# Patient Record
Sex: FEMALE | Race: WHITE | URBAN - METROPOLITAN AREA
[De-identification: names, ages, dates, MRNs, and addresses within clinical notes are randomized per-mention and may not be internally consistent; named-entity substitution may affect disease eponyms.]

---

## 2022-03-29 ENCOUNTER — OFFICE VISIT (OUTPATIENT)
Dept: URBAN - METROPOLITAN AREA CLINIC 54 | Facility: CLINIC | Age: 22
End: 2022-03-29
Payer: OTHER GOVERNMENT

## 2022-03-29 ENCOUNTER — WEB ENCOUNTER (OUTPATIENT)
Dept: URBAN - METROPOLITAN AREA CLINIC 54 | Facility: CLINIC | Age: 22
End: 2022-03-29

## 2022-03-29 DIAGNOSIS — K50.013 CROHN'S DISEASE OF SMALL INTESTINE WITH FISTULA: ICD-10-CM

## 2022-03-29 PROCEDURE — 99204 OFFICE O/P NEW MOD 45 MIN: CPT | Performed by: STUDENT IN AN ORGANIZED HEALTH CARE EDUCATION/TRAINING PROGRAM

## 2022-03-29 RX ORDER — ADALIMUMAB 40MG/0.8ML
0.8 ML KIT SUBCUTANEOUS EVERY 2 WEEKS
Qty: 6 | Refills: 1 | OUTPATIENT
Start: 2022-03-29 | End: 2022-09-25

## 2022-03-29 NOTE — EXAM-PHYSICAL EXAM
General: Well appearing. No acute distress. HEENT: Anicteric sclerae Cardiovascular: Normal heart rate Respiratory: Breathing comfortably without conversational dyspnea Abdomen:  Hypogastric vertical surgical scar, non-distended, soft, non-tender Rectal: Deferred Skin: without visible rashes on examined areas. Musculoskeletal: ambulated without difficulty. Can stand from sitting position without assistance. Neuro: No focal deficits. Alert and oriented. Psych: Appropriate mood and affect.

## 2022-03-29 NOTE — HPI-TODAY'S VISIT:
Ms. Tam is a 21-year-old woman who presents today for Crohn's disease. 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   Patient states that she was diagnosed with Crohn's at age 15 (2016).  She had abdominal pain, vomiting and bloody stools and subsequent had colonoscopy and EGD.  Colonoscopy significant for ileal disease.  Medical history Apriso X 8 months Remicade x 3-4 months. Stopped due to side effects (Hair loss, depression, anxiety) Methotrexate x 1.5 years.  She self-discontinued due to side effects ( Hair loss, depression, anxiety) in 2018. 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 She subsequently established care with Dr. Erika Mondragon, a gastroenterologist in [place] who started her on Humira in January 2022. She now takes Humira 40 mg q 2 weeks.  She recently took Ibuprofen and is a current smoker (vape)  Kenji Goel Index Questionnaire:  General well being: Very well  Abdominal pain:  None  Number of liquid stool for the previous day: None.  Abdominal Mass: none Arthalgia: No Uveitis: No EN: No Apthous Ulcers: No Pyoderma Gangrenosum: No Anal Fissures: No New Fistula: No Abscess: No

## 2022-04-07 ENCOUNTER — TELEPHONE ENCOUNTER (OUTPATIENT)
Dept: URBAN - METROPOLITAN AREA CLINIC 54 | Facility: CLINIC | Age: 22
End: 2022-04-07

## 2022-04-07 RX ORDER — ADALIMUMAB 40MG/0.8ML
0.8 ML KIT SUBCUTANEOUS EVERY 2 WEEKS
Qty: 6 | Refills: 1 | Status: ACTIVE | COMMUNITY
Start: 2022-03-29 | End: 2022-09-25

## 2022-04-07 RX ORDER — ADALIMUMAB 40MG/0.8ML
AS DIRECTED KIT SUBCUTANEOUS
Refills: 1 | OUTPATIENT
Start: 2022-04-11

## 2022-04-10 LAB
A/G RATIO: 1.7
ADALIMUMAB DRUG LEVEL: 3
ALBUMIN: 4.7
ALKALINE PHOSPHATASE: 98
ALT (SGPT): 17
ANTI-ADALIMUMAB ANTIBODY: 37
AST (SGOT): 16
BASO (ABSOLUTE): 0.1
BASOS: 1
BILIRUBIN, TOTAL: 0.3
BUN/CREATININE RATIO: 21
BUN: 12
C-REACTIVE PROTEIN, QUANT: <1
CALCIUM: 10.2
CARBON DIOXIDE, TOTAL: 21
CHLORIDE: 103
CREATININE: 0.56
EGFR: 133
EOS (ABSOLUTE): 0.2
EOS: 3
FOLATE (FOLIC ACID), SERUM: 12.1
GLOBULIN, TOTAL: 2.8
GLUCOSE: 76
HEMATOCRIT: 42.6
HEMATOLOGY COMMENTS:: (no result)
HEMOGLOBIN: 13.6
IMMATURE CELLS: (no result)
IMMATURE GRANS (ABS): 0
IMMATURE GRANULOCYTES: 0
LYMPHS (ABSOLUTE): 2.8
LYMPHS: 41
MCH: 29.6
MCHC: 31.9
MCV: 93
MONOCYTES(ABSOLUTE): 0.5
MONOCYTES: 8
NEUTROPHILS (ABSOLUTE): 3.3
NEUTROPHILS: 47
NRBC: (no result)
PLATELETS: 187
POTASSIUM: 3.8
PROTEIN, TOTAL: 7.5
RBC: 4.6
RDW: 12
SODIUM: 138
VITAMIN B12: 514
VITAMIN D, 25-HYDROXY: 12.2
WBC: 6.9

## 2022-04-11 PROBLEM — 56689002: Status: ACTIVE | Noted: 2022-03-29

## 2022-04-24 ENCOUNTER — TELEPHONE ENCOUNTER (OUTPATIENT)
Dept: URBAN - METROPOLITAN AREA CLINIC 54 | Facility: CLINIC | Age: 22
End: 2022-04-24

## 2022-04-24 PROBLEM — 34713006: Status: ACTIVE | Noted: 2022-04-24

## 2022-04-24 RX ORDER — ERGOCALCIFEROL 1.25 MG/1
1 CAPSULE CAPSULE, LIQUID FILLED ORAL WEEKLY
Qty: 8 | Refills: 0 | OUTPATIENT
Start: 2022-04-24 | End: 2022-06-19

## 2022-04-24 RX ORDER — ADALIMUMAB 40MG/0.8ML
0.8 ML KIT SUBCUTANEOUS EVERY 2 WEEKS
Qty: 6 | Refills: 1 | Status: ON HOLD | COMMUNITY
Start: 2022-03-29 | End: 2022-09-25

## 2022-04-24 RX ORDER — ADALIMUMAB 40MG/0.8ML
AS DIRECTED KIT SUBCUTANEOUS
Refills: 1 | Status: ACTIVE | COMMUNITY
Start: 2022-04-11

## 2022-04-24 RX ORDER — ADALIMUMAB 40MG/0.8ML
AS DIRECTED KIT SUBCUTANEOUS
Qty: 6 PRE-FILLED PEN SYRINGE | Refills: 1 | OUTPATIENT

## 2022-04-29 ENCOUNTER — TELEPHONE ENCOUNTER (OUTPATIENT)
Dept: URBAN - METROPOLITAN AREA CLINIC 54 | Facility: CLINIC | Age: 22
End: 2022-04-29

## 2022-05-02 ENCOUNTER — TELEPHONE ENCOUNTER (OUTPATIENT)
Dept: URBAN - METROPOLITAN AREA CLINIC 54 | Facility: CLINIC | Age: 22
End: 2022-05-02

## 2022-05-02 RX ORDER — ADALIMUMAB 40MG/0.8ML
AS DIRECTED KIT SUBCUTANEOUS
Qty: 4 PEN NEEDLE | Refills: 5 | OUTPATIENT

## 2022-05-03 ENCOUNTER — OFFICE VISIT (OUTPATIENT)
Dept: URBAN - METROPOLITAN AREA CLINIC 54 | Facility: CLINIC | Age: 22
End: 2022-05-03

## 2022-05-03 RX ORDER — ADALIMUMAB 40MG/0.8ML
0.8 ML KIT SUBCUTANEOUS EVERY 2 WEEKS
Qty: 6 | Refills: 1 | COMMUNITY
Start: 2022-03-29 | End: 2022-09-25

## 2022-05-04 ENCOUNTER — TELEPHONE ENCOUNTER (OUTPATIENT)
Dept: URBAN - METROPOLITAN AREA CLINIC 54 | Facility: CLINIC | Age: 22
End: 2022-05-04

## 2022-05-04 RX ORDER — ADALIMUMAB 40MG/0.8ML
0.8ML EVERY 2 WEEKS KIT SUBCUTANEOUS
Qty: 6 PEN NEEDLE | Refills: 1 | OUTPATIENT

## 2022-05-26 ENCOUNTER — OFFICE VISIT (OUTPATIENT)
Dept: URBAN - METROPOLITAN AREA CLINIC 54 | Facility: CLINIC | Age: 22
End: 2022-05-26

## 2022-05-26 RX ORDER — ERGOCALCIFEROL 1.25 MG/1
1 CAPSULE CAPSULE, LIQUID FILLED ORAL WEEKLY
Qty: 8 | Refills: 0 | COMMUNITY
Start: 2022-04-24 | End: 2022-06-19

## 2022-05-26 RX ORDER — ADALIMUMAB 40MG/0.8ML
0.8ML EVERY 2 WEEKS KIT SUBCUTANEOUS
Qty: 6 PEN NEEDLE | Refills: 1 | COMMUNITY

## 2022-05-26 RX ORDER — ADALIMUMAB 40MG/0.8ML
0.8 ML KIT SUBCUTANEOUS EVERY 2 WEEKS
Qty: 6 | Refills: 1 | COMMUNITY
Start: 2022-03-29 | End: 2022-09-25

## 2022-06-03 ENCOUNTER — DASHBOARD ENCOUNTERS (OUTPATIENT)
Age: 22
End: 2022-06-03

## 2022-06-16 ENCOUNTER — OFFICE VISIT (OUTPATIENT)
Dept: URBAN - METROPOLITAN AREA CLINIC 54 | Facility: CLINIC | Age: 22
End: 2022-06-16

## 2022-06-16 RX ORDER — ADALIMUMAB 40MG/0.8ML
0.8 ML KIT SUBCUTANEOUS EVERY 2 WEEKS
Qty: 6 | Refills: 1 | COMMUNITY
Start: 2022-03-29 | End: 2022-09-25

## 2022-06-16 RX ORDER — ADALIMUMAB 40MG/0.8ML
0.8ML EVERY 2 WEEKS KIT SUBCUTANEOUS
Qty: 6 PEN NEEDLE | Refills: 1 | COMMUNITY

## 2022-06-16 RX ORDER — ERGOCALCIFEROL 1.25 MG/1
1 CAPSULE CAPSULE, LIQUID FILLED ORAL WEEKLY
Qty: 8 | Refills: 0 | COMMUNITY
Start: 2022-04-24 | End: 2022-06-19